# Patient Record
Sex: FEMALE | Race: BLACK OR AFRICAN AMERICAN | NOT HISPANIC OR LATINO | URBAN - METROPOLITAN AREA
[De-identification: names, ages, dates, MRNs, and addresses within clinical notes are randomized per-mention and may not be internally consistent; named-entity substitution may affect disease eponyms.]

---

## 2018-06-20 ENCOUNTER — OFFICE VISIT (OUTPATIENT)
Dept: URGENT CARE | Facility: CLINIC | Age: 43
End: 2018-06-20
Payer: MEDICAID

## 2018-06-20 VITALS
OXYGEN SATURATION: 99 % | TEMPERATURE: 98 F | DIASTOLIC BLOOD PRESSURE: 73 MMHG | WEIGHT: 146 LBS | SYSTOLIC BLOOD PRESSURE: 111 MMHG | HEART RATE: 65 BPM

## 2018-06-20 DIAGNOSIS — S91.332A PUNCTURE WOUND OF PLANTAR ASPECT OF LEFT FOOT, INITIAL ENCOUNTER: Primary | ICD-10-CM

## 2018-06-20 PROCEDURE — 99203 OFFICE O/P NEW LOW 30 MIN: CPT | Mod: S$GLB,,, | Performed by: FAMILY MEDICINE

## 2018-06-20 RX ORDER — NAPROXEN 500 MG/1
500 TABLET ORAL 2 TIMES DAILY WITH MEALS
Qty: 60 TABLET | Refills: 2 | Status: SHIPPED | OUTPATIENT
Start: 2018-06-20 | End: 2019-06-20

## 2018-06-20 RX ORDER — MUPIROCIN 20 MG/G
OINTMENT TOPICAL
Qty: 22 G | Refills: 1 | Status: SHIPPED | OUTPATIENT
Start: 2018-06-20 | End: 2020-10-11

## 2018-06-20 NOTE — PATIENT INSTRUCTIONS
Puncture Wound: Foot       A puncture wound occurs when a pointed object (such as a nail) pushes into the skin. It may go into the tissues below the skin of the foot, including fat and muscle. This type of wound is narrow and deep. They can be hard to clean. Puncture wounds are at high risk for becoming infected. One type of serious infection is more likely if you were wearing a rubber-soled shoe at the time of injury. Bacteria from the sole of the shoe may be dragged into the wound. Symptoms of infection may appear as late as 2 to 3 weeks after the injury. Be sure to watch for symptoms of infection and call your healthcare provider right away if any them appear.  X-rays may be done to see whether any objects remain under the skin. Your may also need a tetanus shot. This is given if you are not up to date on this vaccination and the object that caused the wound may lead to tetanus.  Puncture wounds can easily become infected.   Home care  · When you sit or lie down, raise the foot above the level of your heart. This helps reduce swelling and pain.  · Do not put weight on the injured foot if it hurts to do so or if you were told to keep weight off the injury.  · Your healthcare provider may prescribe an antibiotic. This is to help prevent infection. Follow all instructions for taking this medicine. Take the medicine every day until it is gone or you are told to stop. You should not have any left over.  · The healthcare provider may prescribe medicines for pain. Follow instructions for taking them.  · You can take acetaminophen or ibuprofen for pain, unless you were given a different pain medicine to use.   · Follow the healthcare providers instructions on how to care for the wound.  · Keep the wound clean and dry. Do not get the wound wet until you are told it is okay to do so. If the area gets wet, gently pat it dry with a clean cloth. Replace the wet bandage with a dry one.  · If a bandage was applied and it  becomes wet or dirty, replace it. Otherwise, leave it in place for the first 24 hours.  · Once you can get the wound wet, you may shower as usual but do not soak the wound in water (no tub baths or swimming)  · Check the wound daily for symptoms of infection. These include:  ¨ Increasing redness or swelling around the wound  ¨ Increased warmth of the wound  ¨ Worsening pain  ¨ Red streaking lines away from the wound  ¨ Draining pus  Follow-up care  Follow up with your healthcare provider as advised.   When to seek medical advice  Call your healthcare provider right away if any of these occur:  · Any symptoms of infection (listed above)  · Fever of 100.4°F (38.ºC) or higher, or as directed by your healthcare provider  · Wound changes colors  · Numbness around the wound  · Decreased movement around the injured area  Date Last Reviewed: 8/24/2015  © 4024-0253 [employer]. [address] [place] [address], [place] [address]. All rights reserved. This information is not intended as a substitute for professional medical care. Always follow your healthcare professional's instructions.

## 2018-06-20 NOTE — PROGRESS NOTES
Subjective:       Patient ID: Sandrine Lr is a 42 y.o. female.    Vitals:  weight is 66.2 kg (146 lb). Her tympanic temperature is 98.1 °F (36.7 °C). Her blood pressure is 111/73 and her pulse is 65. Her oxygen saturation is 99%.     Chief Complaint: Heel Pain    Pt states that she stepped on a old damion sign on her left heel about 4 days ago , tetanus is unknown      Foot Injury    The incident occurred more than 1 week ago. The incident occurred in the street. Injury mechanism: puncture. The pain is present in the left heel. The quality of the pain is described as stabbing. The pain is at a severity of 6/10. The pain is mild. The pain has been constant since onset. Associated symptoms include an inability to bear weight. She has tried nothing for the symptoms.     Review of Systems   All other systems reviewed and are negative.      Objective:      Physical Exam   Constitutional: She is oriented to person, place, and time. She appears well-developed.   HENT:   Head: Normocephalic.   Nose: Nose normal.   Mouth/Throat: Oropharynx is clear and moist.   Eyes: Conjunctivae and EOM are normal. Pupils are equal, round, and reactive to light.   Cardiovascular: Normal rate and regular rhythm.    Pulmonary/Chest: Breath sounds normal.   Abdominal: Bowel sounds are normal.   Neurological: She is alert and oriented to person, place, and time.   Skin: There is erythema.   Puncture woundon the plantar aspect of the left foot near heel. Mild ttp       Assessment:       1. Puncture wound of plantar aspect of left foot, initial encounter        Plan:         Puncture wound of plantar aspect of left foot, initial encounter  -     mupirocin (BACTROBAN) 2 % ointment; Apply to affected area 3 times daily  Dispense: 22 g; Refill: 1  -     diptheria-tetanus toxoids 2-2 Lf unit/0.5 mL injection 0.5 mL; Inject 0.5 mLs into the muscle once.  -     naproxen (NAPROSYN) 500 MG tablet; Take 1 tablet (500 mg total) by mouth 2 (two)  times daily with meals.  Dispense: 60 tablet; Refill: 2      Patient Instructions     Puncture Wound: Foot       A puncture wound occurs when a pointed object (such as a nail) pushes into the skin. It may go into the tissues below the skin of the foot, including fat and muscle. This type of wound is narrow and deep. They can be hard to clean. Puncture wounds are at high risk for becoming infected. One type of serious infection is more likely if you were wearing a rubber-soled shoe at the time of injury. Bacteria from the sole of the shoe may be dragged into the wound. Symptoms of infection may appear as late as 2 to 3 weeks after the injury. Be sure to watch for symptoms of infection and call your healthcare provider right away if any them appear.  X-rays may be done to see whether any objects remain under the skin. Your may also need a tetanus shot. This is given if you are not up to date on this vaccination and the object that caused the wound may lead to tetanus.  Puncture wounds can easily become infected.   Home care  · When you sit or lie down, raise the foot above the level of your heart. This helps reduce swelling and pain.  · Do not put weight on the injured foot if it hurts to do so or if you were told to keep weight off the injury.  · Your healthcare provider may prescribe an antibiotic. This is to help prevent infection. Follow all instructions for taking this medicine. Take the medicine every day until it is gone or you are told to stop. You should not have any left over.  · The healthcare provider may prescribe medicines for pain. Follow instructions for taking them.  · You can take acetaminophen or ibuprofen for pain, unless you were given a different pain medicine to use.   · Follow the healthcare providers instructions on how to care for the wound.  · Keep the wound clean and dry. Do not get the wound wet until you are told it is okay to do so. If the area gets wet, gently pat it dry with a clean  cloth. Replace the wet bandage with a dry one.  · If a bandage was applied and it becomes wet or dirty, replace it. Otherwise, leave it in place for the first 24 hours.  · Once you can get the wound wet, you may shower as usual but do not soak the wound in water (no tub baths or swimming)  · Check the wound daily for symptoms of infection. These include:  ¨ Increasing redness or swelling around the wound  ¨ Increased warmth of the wound  ¨ Worsening pain  ¨ Red streaking lines away from the wound  ¨ Draining pus  Follow-up care  Follow up with your healthcare provider as advised.   When to seek medical advice  Call your healthcare provider right away if any of these occur:  · Any symptoms of infection (listed above)  · Fever of 100.4°F (38.ºC) or higher, or as directed by your healthcare provider  · Wound changes colors  · Numbness around the wound  · Decreased movement around the injured area  Date Last Reviewed: 8/24/2015  © 0664-8789 [employer]. [address] [place] [address], [place] [address]. All rights reserved. This information is not intended as a substitute for professional medical care. Always follow your healthcare professional's instructions.

## 2018-12-20 ENCOUNTER — OFFICE VISIT (OUTPATIENT)
Dept: URGENT CARE | Facility: CLINIC | Age: 43
End: 2018-12-20
Payer: MEDICAID

## 2018-12-20 VITALS
DIASTOLIC BLOOD PRESSURE: 67 MMHG | HEIGHT: 68 IN | BODY MASS INDEX: 20.92 KG/M2 | HEART RATE: 71 BPM | SYSTOLIC BLOOD PRESSURE: 102 MMHG | TEMPERATURE: 98 F | OXYGEN SATURATION: 99 % | WEIGHT: 138 LBS

## 2018-12-20 DIAGNOSIS — R10.13 EPIGASTRIC PAIN: Primary | ICD-10-CM

## 2018-12-20 PROCEDURE — 99214 OFFICE O/P EST MOD 30 MIN: CPT | Mod: S$GLB,,, | Performed by: SURGERY

## 2018-12-20 RX ORDER — PANTOPRAZOLE SODIUM 40 MG/1
40 TABLET, DELAYED RELEASE ORAL DAILY
Qty: 30 TABLET | Refills: 0 | Status: SHIPPED | OUTPATIENT
Start: 2018-12-20

## 2018-12-20 RX ORDER — METOPROLOL SUCCINATE 50 MG/1
TABLET, EXTENDED RELEASE ORAL
Refills: 3 | COMMUNITY
Start: 2018-12-15

## 2018-12-20 NOTE — PATIENT INSTRUCTIONS
Metamucil fiber and colace may be used for stool softener in case of constipation.    Tylenol is preferred for headache until stomach pain is improved.        Epigastric Pain (Uncertain Cause)     Epigastric pain can be a sign of disease in the upper abdomen. Common causes include:  · Acid reflux (stomach acid flowing up into the esophagus)  · Gastritis (irritation of the stomach lining)  · Peptic Ulcer Disease  · Inflammation of the pancreas  · Gallstone  · Infection in the gallbladder  Pain may be dull or burning. It may spread upward to the chest or to the back. There may be other symptoms such as belching, bloating, cramps or hunger pains. There may be weight loss or poor appetite, nausea or vomiting.  Since the diagnosis of your pain is not certain yet, further tests may sometimes be needed. Sometimes the doctor will treat you for the most likely condition to see if there is improvement before doing further tests.  Home care  Medicines  · Antacids help neutralize the normal acids in your stomach. Examples are Maalox, Mylanta, Rolaids, and Tums. If you dont like the liquid, you can also try a chewable one. You may find one works better than another for you. Overuse can cause diarrhea or constipation.  · Acid blockers (H2 blockers) decrease acid production. Examples are cimetidine (Tagamet), famotidine (Pepcid) and ranitidine (Zantac).  · Acid inhibitors (PPIs) decrease acid production in a different way than the blockers. You may find they work better, but can take a little longer to take effect.  Examples are omeprazole (Prilosec), lansoprazole (Prevacid), pantoprazole (Protonix), rabeprazole (Aciphex), and esomeprazole (Nexium).  · Take an antacid 30-60 minutes after eating and at bedtime, but not at the same time as an acid blocker.  · Try not to take NSAIDs. Aspirin may also cause problems, but if taking it for your heart or other medical reasons, talk to your doctor before stopping it; you do not want to  cause a worse problem, like a heart attack or stroke.  Diet  · If certain foods seem to cause your spasm, try to avoid them.   · Eat slowly and chew food well before swallowing. Symptoms of gastritis can be worsened by certain foods. Limit or avoid fatty, fried, and spicy foods, as well as coffee, chocolate, mint, and foods with high acid content such as tomatoes and citrus fruit and juices (orange, grapefruit, lemon).  · Avoid alcohol, caffeine, and tobacco, which can delay healing and worsen your problem.  · Try eating smaller meals with snacks in between  Follow-up care  Follow up with your healthcare provider or as advised.  When to seek medical advice  Call your healthcare provider right away if any of the following occur:  · Stomach pain worsens or moves to the right lower part of the abdomen  · Chest pain appears, or if it worsens or spreads to the chest, back, neck, shoulder, or arm  · Frequent vomiting (cant keep down liquids)  · Blood in the stool or vomit (red or black color)  · Feeling weak or dizzy, fainting, or having trouble breathing  · Fever of 100.4ºF (38ºC) or higher, or as directed by your healthcare provider  · Abdominal swelling  Date Last Reviewed: 9/25/2015  © 2338-5779 [employer]. [address] [place] [address], [place] [address]. All rights reserved. This information is not intended as a substitute for professional medical care. Always follow your healthcare professional's instructions.

## 2018-12-20 NOTE — PROGRESS NOTES
"Subjective:       Patient ID: Sandrine Lr is a 43 y.o. female.    Vitals:  height is 5' 8" (1.727 m) and weight is 62.6 kg (138 lb). Her temperature is 97.8 °F (36.6 °C). Her blood pressure is 102/67 and her pulse is 71. Her oxygen saturation is 99%.     Chief Complaint: Abdominal Pain    Abdominal Pain   This is a new problem. The current episode started more than 1 month ago. The onset quality is sudden. The problem occurs constantly. The problem has been unchanged. The pain is located in the generalized abdominal region. The pain is at a severity of 9/10. The pain is severe. The quality of the pain is aching, burning and cramping. Associated symptoms include nausea. Pertinent negatives include no constipation, diarrhea, dysuria, fever or vomiting. The pain is aggravated by eating. Treatments tried: zantac  The treatment provided mild relief. There is no history of abdominal surgery.       Constitution: Negative for appetite change, chills, sweating and fever.   HENT: Negative for trouble swallowing.    Cardiovascular: Negative for chest pain.   Respiratory: Negative for shortness of breath.    Gastrointestinal: Positive for abdominal pain, nausea and dark colored stools. Negative for abdominal trauma, abdominal bloating, history of abdominal surgery, vomiting, constipation, diarrhea and heartburn.   Genitourinary: Negative for dysuria, missed menses and pelvic pain.   Musculoskeletal: Negative for back pain.       Objective:      Physical Exam   Constitutional: She is oriented to person, place, and time. She appears well-developed and well-nourished. She is cooperative.  Non-toxic appearance. She does not appear ill. No distress.   HENT:   Head: Normocephalic and atraumatic.   Right Ear: Hearing, external ear and ear canal normal. Tympanic membrane is injected and erythematous.   Left Ear: Hearing, external ear and ear canal normal. Tympanic membrane is erythematous and retracted.   Nose: Mucosal edema " and rhinorrhea present. No nasal deformity. No epistaxis. Right sinus exhibits no maxillary sinus tenderness and no frontal sinus tenderness. Left sinus exhibits no maxillary sinus tenderness and no frontal sinus tenderness.   Mouth/Throat: Uvula is midline, oropharynx is clear and moist and mucous membranes are normal. No trismus in the jaw. Normal dentition. No uvula swelling. No posterior oropharyngeal erythema.   Eyes: Conjunctivae and lids are normal. No scleral icterus.   Sclera clear bilat   Neck: Trachea normal, full passive range of motion without pain and phonation normal. Neck supple.   Cardiovascular: Normal rate, regular rhythm, normal heart sounds, intact distal pulses and normal pulses.   Pulmonary/Chest: Effort normal and breath sounds normal. No respiratory distress.   Abdominal: Soft. Normal appearance and bowel sounds are normal. She exhibits no distension. There is no tenderness.   Musculoskeletal: Normal range of motion. She exhibits no edema or deformity.   Neurological: She is alert and oriented to person, place, and time. She exhibits normal muscle tone. Coordination normal.   Skin: Skin is warm, dry and intact. She is not diaphoretic. No pallor.   Psychiatric: She has a normal mood and affect. Her speech is normal and behavior is normal. Judgment and thought content normal. Cognition and memory are normal.   Nursing note and vitals reviewed.      Assessment:       1. Epigastric pain        Plan:         Epigastric pain  -     pantoprazole (PROTONIX) 40 MG tablet; Take 1 tablet (40 mg total) by mouth once daily.  Dispense: 30 tablet; Refill: 0      Patient Instructions   Metamucil fiber and colace may be used for stool softener in case of constipation.    Tylenol is preferred for headache until stomach pain is improved.        Epigastric Pain (Uncertain Cause)     Epigastric pain can be a sign of disease in the upper abdomen. Common causes include:  · Acid reflux (stomach acid flowing up into  the esophagus)  · Gastritis (irritation of the stomach lining)  · Peptic Ulcer Disease  · Inflammation of the pancreas  · Gallstone  · Infection in the gallbladder  Pain may be dull or burning. It may spread upward to the chest or to the back. There may be other symptoms such as belching, bloating, cramps or hunger pains. There may be weight loss or poor appetite, nausea or vomiting.  Since the diagnosis of your pain is not certain yet, further tests may sometimes be needed. Sometimes the doctor will treat you for the most likely condition to see if there is improvement before doing further tests.  Home care  Medicines  · Antacids help neutralize the normal acids in your stomach. Examples are Maalox, Mylanta, Rolaids, and Tums. If you dont like the liquid, you can also try a chewable one. You may find one works better than another for you. Overuse can cause diarrhea or constipation.  · Acid blockers (H2 blockers) decrease acid production. Examples are cimetidine (Tagamet), famotidine (Pepcid) and ranitidine (Zantac).  · Acid inhibitors (PPIs) decrease acid production in a different way than the blockers. You may find they work better, but can take a little longer to take effect.  Examples are omeprazole (Prilosec), lansoprazole (Prevacid), pantoprazole (Protonix), rabeprazole (Aciphex), and esomeprazole (Nexium).  · Take an antacid 30-60 minutes after eating and at bedtime, but not at the same time as an acid blocker.  · Try not to take NSAIDs. Aspirin may also cause problems, but if taking it for your heart or other medical reasons, talk to your doctor before stopping it; you do not want to cause a worse problem, like a heart attack or stroke.  Diet  · If certain foods seem to cause your spasm, try to avoid them.   · Eat slowly and chew food well before swallowing. Symptoms of gastritis can be worsened by certain foods. Limit or avoid fatty, fried, and spicy foods, as well as coffee, chocolate, mint, and foods  with high acid content such as tomatoes and citrus fruit and juices (orange, grapefruit, lemon).  · Avoid alcohol, caffeine, and tobacco, which can delay healing and worsen your problem.  · Try eating smaller meals with snacks in between  Follow-up care  Follow up with your healthcare provider or as advised.  When to seek medical advice  Call your healthcare provider right away if any of the following occur:  · Stomach pain worsens or moves to the right lower part of the abdomen  · Chest pain appears, or if it worsens or spreads to the chest, back, neck, shoulder, or arm  · Frequent vomiting (cant keep down liquids)  · Blood in the stool or vomit (red or black color)  · Feeling weak or dizzy, fainting, or having trouble breathing  · Fever of 100.4ºF (38ºC) or higher, or as directed by your healthcare provider  · Abdominal swelling  Date Last Reviewed: 9/25/2015  © 6400-3325 [employer]. [address] [place] [address], [place] [address]. All rights reserved. This information is not intended as a substitute for professional medical care. Always follow your healthcare professional's instructions.

## 2019-03-06 ENCOUNTER — OFFICE VISIT (OUTPATIENT)
Dept: URGENT CARE | Facility: CLINIC | Age: 44
End: 2019-03-06
Payer: MEDICAID

## 2019-03-06 VITALS
SYSTOLIC BLOOD PRESSURE: 101 MMHG | WEIGHT: 138 LBS | BODY MASS INDEX: 20.92 KG/M2 | HEART RATE: 82 BPM | TEMPERATURE: 99 F | DIASTOLIC BLOOD PRESSURE: 69 MMHG | HEIGHT: 68 IN | OXYGEN SATURATION: 98 % | RESPIRATION RATE: 20 BRPM

## 2019-03-06 DIAGNOSIS — H02.826 CYST OF LEFT EYELID: Primary | ICD-10-CM

## 2019-03-06 PROCEDURE — 99214 PR OFFICE/OUTPT VISIT, EST, LEVL IV, 30-39 MIN: ICD-10-PCS | Mod: S$GLB,,, | Performed by: SURGERY

## 2019-03-06 PROCEDURE — 99214 OFFICE O/P EST MOD 30 MIN: CPT | Mod: S$GLB,,, | Performed by: SURGERY

## 2019-03-06 RX ORDER — DOXYCYCLINE 100 MG/1
100 CAPSULE ORAL 2 TIMES DAILY
Qty: 14 CAPSULE | Refills: 0 | Status: SHIPPED | OUTPATIENT
Start: 2019-03-06 | End: 2019-03-13

## 2019-03-06 RX ORDER — LORAZEPAM 1 MG/1
TABLET ORAL
Refills: 1 | COMMUNITY
Start: 2019-02-06

## 2019-03-06 RX ORDER — AMITRIPTYLINE HYDROCHLORIDE 25 MG/1
TABLET, FILM COATED ORAL
Refills: 3 | COMMUNITY
Start: 2019-02-17

## 2019-03-06 NOTE — PROGRESS NOTES
"Subjective:       Patient ID: Sandrine Lr is a 43 y.o. female.    Vitals:  height is 5' 8" (1.727 m) and weight is 62.6 kg (138 lb). Her temperature is 98.6 °F (37 °C). Her blood pressure is 101/69 and her pulse is 82. Her respiration is 20 and oxygen saturation is 98%.     Chief Complaint: Facial Swelling    Pt staes for three days she has been having swelling around her left eye. She also has a bump above it that she tried to bust .      Eye Problem    The left eye is affected. This is a new problem. Episode onset: 2 days ago. The problem occurs constantly. The problem has been unchanged. There was no injury mechanism. There is no known exposure to pink eye. She does not wear contacts. She has tried nothing for the symptoms.       Eyes: Positive for eye pain.       Objective:      Physical Exam   Constitutional: She is oriented to person, place, and time. She appears well-developed and well-nourished.   HENT:   Head: Normocephalic and atraumatic.   Right Ear: External ear normal.   Left Ear: External ear normal.   Nose: Nose normal.   Mouth/Throat: Oropharynx is clear and moist.   Eyes: Conjunctivae, EOM and lids are normal. Pupils are equal, round, and reactive to light.       Neck: Trachea normal, full passive range of motion without pain and phonation normal. Neck supple.   Musculoskeletal: Normal range of motion.   Neurological: She is alert and oriented to person, place, and time.   Skin: Skin is warm, dry and intact.   Psychiatric: She has a normal mood and affect. Her speech is normal and behavior is normal. Judgment and thought content normal. Cognition and memory are normal.   Nursing note and vitals reviewed.      Assessment:       1. Cyst of left eyelid        Plan:         Cyst of left eyelid  -     doxycycline (MONODOX) 100 MG capsule; Take 1 capsule (100 mg total) by mouth 2 (two) times daily. for 7 days  Dispense: 14 capsule; Refill: 0         "

## 2020-10-11 ENCOUNTER — OFFICE VISIT (OUTPATIENT)
Dept: URGENT CARE | Facility: CLINIC | Age: 45
End: 2020-10-11
Payer: MEDICAID

## 2020-10-11 VITALS
HEIGHT: 68 IN | RESPIRATION RATE: 18 BRPM | BODY MASS INDEX: 20.98 KG/M2 | OXYGEN SATURATION: 97 % | TEMPERATURE: 99 F | SYSTOLIC BLOOD PRESSURE: 120 MMHG | HEART RATE: 74 BPM | DIASTOLIC BLOOD PRESSURE: 78 MMHG

## 2020-10-11 DIAGNOSIS — S01.81XA LACERATION OF FOREHEAD WITHOUT COMPLICATION, INITIAL ENCOUNTER: Primary | ICD-10-CM

## 2020-10-11 PROBLEM — F41.9 ANXIETY: Status: ACTIVE | Noted: 2019-09-22

## 2020-10-11 PROBLEM — K21.9 GASTROESOPHAGEAL REFLUX DISEASE WITHOUT ESOPHAGITIS: Status: ACTIVE | Noted: 2020-10-11

## 2020-10-11 PROBLEM — G62.9 NEUROPATHY: Status: ACTIVE | Noted: 2020-10-11

## 2020-10-11 PROBLEM — I10 ESSENTIAL HYPERTENSION: Status: ACTIVE | Noted: 2020-10-11

## 2020-10-11 PROCEDURE — 12011 RPR F/E/E/N/L/M 2.5 CM/<: CPT | Mod: S$GLB,,, | Performed by: STUDENT IN AN ORGANIZED HEALTH CARE EDUCATION/TRAINING PROGRAM

## 2020-10-11 PROCEDURE — 12011 LACERATION REPAIR: ICD-10-PCS | Mod: S$GLB,,, | Performed by: STUDENT IN AN ORGANIZED HEALTH CARE EDUCATION/TRAINING PROGRAM

## 2020-10-11 PROCEDURE — 99213 PR OFFICE/OUTPT VISIT, EST, LEVL III, 20-29 MIN: ICD-10-PCS | Mod: 25,S$GLB,, | Performed by: STUDENT IN AN ORGANIZED HEALTH CARE EDUCATION/TRAINING PROGRAM

## 2020-10-11 PROCEDURE — 99213 OFFICE O/P EST LOW 20 MIN: CPT | Mod: 25,S$GLB,, | Performed by: STUDENT IN AN ORGANIZED HEALTH CARE EDUCATION/TRAINING PROGRAM

## 2020-10-11 RX ORDER — MUPIROCIN 20 MG/G
OINTMENT TOPICAL 3 TIMES DAILY
Qty: 22 G | Refills: 0 | Status: SHIPPED | OUTPATIENT
Start: 2020-10-11 | End: 2020-10-18

## 2020-10-11 RX ORDER — IBUPROFEN 600 MG/1
600 TABLET ORAL EVERY 8 HOURS PRN
Qty: 15 TABLET | Refills: 0 | Status: SHIPPED | OUTPATIENT
Start: 2020-10-11 | End: 2020-10-16

## 2020-10-11 NOTE — PROGRESS NOTES
"Subjective:       Patient ID: Sandrine Lr is a 44 y.o. female.    Vitals:  height is 5' 8" (1.727 m). Her temperature is 98.5 °F (36.9 °C). Her pulse is 74. Her respiration is 18 and oxygen saturation is 97%.     Chief Complaint: Laceration    Pt stated that about 45 minutes ago while a surge trampoline park she hit the right side of her head on the metal piece if equipment at [employer]. Now has pain and mild bleeding in area. Denied HA, LoC, vision changes, focal deficits. Tetanus UTD.    Laceration   The incident occurred less than 1 hour ago. The laceration is located on the scalp. The laceration is 2 cm in size. The laceration mechanism was a blunt object. The pain is at a severity of 10/10. The pain is severe. The pain has been constant since onset. She reports no foreign bodies present. Her tetanus status is UTD.       Constitution: Negative for chills, fatigue and fever.   HENT: Positive for facial swelling and facial trauma. Negative for ear pain, congestion, sore throat, trouble swallowing and voice change.    Neck: Negative for neck pain and painful lymph nodes.   Cardiovascular: Negative for chest pain, leg swelling and sob on exertion.   Eyes: Negative for eye trauma, double vision and blurred vision.   Respiratory: Negative for cough and shortness of breath.    Gastrointestinal: Negative for abdominal trauma, nausea, vomiting and diarrhea.   Musculoskeletal: Positive for pain and trauma. Negative for joint pain, joint swelling, back pain, muscle cramps and muscle ache.   Skin: Positive for laceration. Negative for color change, rash and bruising.   Allergic/Immunologic: Negative for seasonal allergies.   Neurological: Negative for dizziness, light-headedness, passing out, facial drooping, speech difficulty, headaches, altered mental status, numbness and tingling.   Hematologic/Lymphatic: Negative for swollen lymph nodes.   Psychiatric/Behavioral: Negative for altered mental status, " nervous/anxious, sleep disturbance and depression. The patient is not nervous/anxious.        Objective:      Physical Exam   Constitutional: She is oriented to person, place, and time. She appears well-developed. No distress.   HENT:   Head: Normocephalic. Head is with laceration. Head is without raccoon's eyes, without Howe's sign and without abrasion.       Ears:   Right Ear: Hearing, tympanic membrane, external ear and ear canal normal. No mastoid tenderness. No hemotympanum.   Left Ear: Hearing, tympanic membrane, external ear and ear canal normal. No mastoid tenderness. No hemotympanum.   Nose: Nose normal. No nasal deformity.   Eyes: Pupils are equal, round, and reactive to light. Conjunctivae, EOM and lids are normal. Right eye exhibits no discharge. Left eye exhibits no discharge. No scleral icterus. extraocular movement intact  Neck: Normal range of motion. Neck supple.   Pulmonary/Chest: Effort normal.   Abdominal: Normal appearance.   Musculoskeletal: Normal range of motion.         General: No deformity.   Neurological: She is alert and oriented to person, place, and time. She displays no weakness. No cranial nerve deficit (CN II-XII intact) or sensory deficit. She exhibits normal muscle tone. Coordination normal.   Skin: Skin is warm and dry. bruisingPsychiatric: Her speech is normal and behavior is normal. Judgment and thought content normal.   Nursing note and vitals reviewed.        Assessment:       1. Laceration of forehead without complication, initial encounter        Plan:         Laceration of forehead without complication, initial encounter  -     mupirocin (BACTROBAN) 2 % ointment; Apply topically 3 (three) times daily. for 7 days  Dispense: 22 g; Refill: 0  -     ibuprofen (ADVIL,MOTRIN) 600 MG tablet; Take 1 tablet (600 mg total) by mouth every 8 (eight) hours as needed for Pain.  Dispense: 15 tablet; Refill: 0  -     Laceration Repair  - counseled on home care and OTC  medications    Follow up in about 1 week (around 10/18/2020) for suture removal, or sooner if signs of infection.    Melvin Moncada MD/MPH  [facility]  [facility]

## 2020-10-11 NOTE — PROCEDURES
"Laceration Repair    Date/Time: 10/11/2020 4:35 PM  Performed by: Melvin Moncada MD  Authorized by: Melvin Moncada MD   Consent Done: Yes  Consent: Verbal consent obtained.  Risks and benefits: risks, benefits and alternatives were discussed  Consent given by: patient  Patient identity confirmed: , name and verbally with patient  Time out: Immediately prior to procedure a "time out" was called to verify the correct patient, procedure, equipment, support staff and site/side marked as required.  Body area: head/neck  Location details: forehead  Laceration length: 1.5 cm  Contaminated: None.  Foreign bodies: no foreign bodies  Tendon involvement: none  Nerve involvement: none  Vascular damage: no  Anesthesia: local infiltration    Anesthesia:  Local Anesthetic: LET (lido,epi,tetracaine)  Anesthetic total: 1 mL  Patient sedated: no  Preparation: Patient was prepped and draped in the usual sterile fashion.  Irrigation solution: tap water (Peroxide)  Irrigation method: syringe  Amount of cleaning: standard  Debridement: none  Degree of undermining: none  Skin closure: 4-0 nylon  Number of sutures: 2  Technique: simple  Approximation: loose  Approximation difficulty: simple  Dressing: antibiotic ointment  Patient tolerance: Patient tolerated the procedure well with no immediate complications        "

## 2020-10-11 NOTE — PATIENT INSTRUCTIONS
Suture Care    Stitches (sutures) are used to close wounds. Sutures also help stop bleeding and speed healing. To help your wound heal, follow the tips on this handout.  Some sutures need to be removed by a healthcare provider. Others dissolve on their own. Sometimes strips of tape are used. Youll be told what kind of sutures you have.   Keep sutures clean  · Avoid doing things that could cause dirt or sweat to get on your sutures. If needed, cover your sutures with a bandage (dressing) to protect them.  · Dont pick at scabs. They help protect the wound.  · Dont wash the area around your sutures unless your healthcare provider says its OK. Then, follow his or her instructions for washing and drying.  Keep sutures dry  · Keep your sutures out of water.  · Take a sponge bath to avoid getting your sutures wound wet, unless your healthcare provider tells you otherwise.  · Ask your provider when can you take a shower or bathe.  · Ask your provider about the best way to keep your sutures dry when bathing or showering.  · If sutures get damp, pat them dry.  Changing your dressing  Leave the dressing in place until you are told to remove it or change it. Change it only as directed, using clean hands:  · After the first ___hours, change your dressing every ___hours.  · Change your dressing if it gets wet or dirty.  Other tips  · To help wounds on an arm or leg heal, use the affected limb as little as possible.  · To help reduce swelling and throbbing, raise the area with sutures above your heart.  · To help prevent itching, cover sutures with gauze. If sutures itch, try not to scratch them.  · For pain relief, try acetaminophen or ibuprofen. Dont use aspirin. It can increase bleeding.  When to seek medical care  Call your healthcare provider if you notice any of the following signs:  · Increased soreness, pain, or tenderness after 24 hours  · A red streak, increased redness, or puffiness near the wound  · White,  yellowish, or bad smelling discharge from the wound  · Bleeding that cant be stopped by applying pressure  · Steri-Strips fall off or stitches dissolve before the wound heals  · Fever over 100.4°F (38.0°C)   Date Last Reviewed: 7/1/2016  © 4696-9775 [employer]. [address] [place] [address], [place] [address]. All rights reserved. This information is not intended as a substitute for professional medical care. Always follow your healthcare professional's instructions.

## 2021-09-28 ENCOUNTER — OFFICE VISIT (OUTPATIENT)
Dept: URGENT CARE | Facility: CLINIC | Age: 46
End: 2021-09-28
Payer: MEDICAID

## 2021-09-28 VITALS
OXYGEN SATURATION: 99 % | TEMPERATURE: 99 F | SYSTOLIC BLOOD PRESSURE: 144 MMHG | HEART RATE: 74 BPM | WEIGHT: 138 LBS | DIASTOLIC BLOOD PRESSURE: 93 MMHG | RESPIRATION RATE: 18 BRPM | BODY MASS INDEX: 20.92 KG/M2 | HEIGHT: 68 IN

## 2021-09-28 DIAGNOSIS — R22.0 LEFT FACIAL SWELLING: ICD-10-CM

## 2021-09-28 DIAGNOSIS — K08.89 PAIN, DENTAL: ICD-10-CM

## 2021-09-28 DIAGNOSIS — K04.7 DENTAL ABSCESS: Primary | ICD-10-CM

## 2021-09-28 PROCEDURE — 99214 PR OFFICE/OUTPT VISIT, EST, LEVL IV, 30-39 MIN: ICD-10-PCS | Mod: S$GLB,,, | Performed by: NURSE PRACTITIONER

## 2021-09-28 PROCEDURE — 99214 OFFICE O/P EST MOD 30 MIN: CPT | Mod: S$GLB,,, | Performed by: NURSE PRACTITIONER

## 2021-09-28 RX ORDER — AMOXICILLIN AND CLAVULANATE POTASSIUM 875; 125 MG/1; MG/1
1 TABLET, FILM COATED ORAL EVERY 12 HOURS
Qty: 14 TABLET | Refills: 0 | Status: SHIPPED | OUTPATIENT
Start: 2021-09-28 | End: 2021-10-05

## 2021-09-28 RX ORDER — IBUPROFEN 600 MG/1
600 TABLET ORAL 3 TIMES DAILY PRN
Qty: 30 TABLET | Refills: 0 | Status: SHIPPED | OUTPATIENT
Start: 2021-09-28

## 2021-09-28 RX ORDER — KETOROLAC TROMETHAMINE 30 MG/ML
30 INJECTION, SOLUTION INTRAMUSCULAR; INTRAVENOUS
Status: COMPLETED | OUTPATIENT
Start: 2021-09-28 | End: 2021-09-28

## 2021-09-28 RX ADMIN — KETOROLAC TROMETHAMINE 30 MG: 30 INJECTION, SOLUTION INTRAMUSCULAR; INTRAVENOUS at 07:09

## 2022-02-08 ENCOUNTER — HOSPITAL ENCOUNTER (EMERGENCY)
Facility: HOSPITAL | Age: 47
Discharge: HOME | End: 2022-02-08
Attending: EMERGENCY MEDICINE
Payer: MEDICAID

## 2022-02-08 VITALS
HEART RATE: 73 BPM | BODY MASS INDEX: 22.22 KG/M2 | RESPIRATION RATE: 17 BRPM | DIASTOLIC BLOOD PRESSURE: 80 MMHG | SYSTOLIC BLOOD PRESSURE: 134 MMHG | HEIGHT: 69 IN | TEMPERATURE: 98 F | WEIGHT: 150 LBS | OXYGEN SATURATION: 98 %

## 2022-02-08 DIAGNOSIS — F41.9 ANXIETY: Primary | ICD-10-CM

## 2022-02-08 PROCEDURE — 99284 EMERGENCY DEPT VISIT MOD MDM: CPT

## 2022-02-08 PROCEDURE — 25000003 PHARM REV CODE 250: Performed by: EMERGENCY MEDICINE

## 2022-02-08 PROCEDURE — 93010 EKG 12-LEAD: ICD-10-PCS | Mod: ,,, | Performed by: INTERNAL MEDICINE

## 2022-02-08 PROCEDURE — 93005 ELECTROCARDIOGRAM TRACING: CPT

## 2022-02-08 PROCEDURE — 93010 ELECTROCARDIOGRAM REPORT: CPT | Mod: ,,, | Performed by: INTERNAL MEDICINE

## 2022-02-08 RX ORDER — DIAZEPAM 5 MG/1
5 TABLET ORAL
Status: COMPLETED | OUTPATIENT
Start: 2022-02-08 | End: 2022-02-08

## 2022-02-08 RX ORDER — HYDROXYZINE HYDROCHLORIDE 25 MG/1
25 TABLET, FILM COATED ORAL EVERY 4 HOURS PRN
Qty: 12 TABLET | Refills: 0 | Status: SHIPPED | OUTPATIENT
Start: 2022-02-08

## 2022-02-08 RX ORDER — DIAZEPAM 5 MG/1
5 TABLET ORAL EVERY 6 HOURS PRN
Qty: 5 TABLET | Refills: 0 | Status: SHIPPED | OUTPATIENT
Start: 2022-02-08 | End: 2022-03-10

## 2022-02-08 RX ADMIN — DIAZEPAM 5 MG: 5 TABLET ORAL at 06:02

## 2022-02-08 NOTE — ED PROVIDER NOTES
"Encounter Date: 2/8/2022       History     Chief Complaint   Patient presents with    Anxiety     Pt presents to ED c/o anxiety.  "I can't sleep at all."  Denies SI or HI.  Hx of anxiety.  Pt taking lorazepam at 0900 yesterday, escitalopram 5mg at 1100 yesterday and night time sleep aid 2200 last night, but was unable to sleep.  Pt tearful in triage and hugging daughter.  Pain 0/10.     HPI     46-year-old female with past medical history of anxiety presents with difficulty sleeping, anxiety.  Patient reports continue to see a new psychiatrist, Dr. Watt, at [facility], was recently started on escitalopram 5 mg, which she has been taking for the last week.  She reports over last week having worsening thoughts, no thoughts of harming herself or anybody else, but has been having difficulty sleeping because of them.  She reports this happened intermittently over the last couple years and even months.  She reports that sometime in the past being on Wellbutrin, lorazepam, amitriptyline, she reports continuing to take her lorazepam daily.  She reports taking a half of a lorazepam yesterday which did not seem to help.  She reports normally taking about 1.5 tablets a day.  She reports having a little bit of a tension headache earlier today, has tried other medications over-the-counter, sleep aids without any improvement in her symptoms.  She reports having follow-up with her psychiatrist next Monday.  Denies any chest pain, visual complaint, numbness/tingling, headache, abdominal pain, nausea/vomiting/diarrhea, shortness of breath, or any further complaint.    Review of patient's allergies indicates:  No Known Allergies  No past medical history on file.  No past surgical history on file.  No family history on file.  Social History     Tobacco Use    Smoking status: Never Smoker     Review of Systems   Constitutional: Negative.    HENT: Negative.    Eyes: Negative.    Respiratory: Negative.    Cardiovascular: Negative.  "   Gastrointestinal: Negative.    Genitourinary: Negative.    Musculoskeletal: Negative.    Skin: Negative.    Neurological: Negative.    Psychiatric/Behavioral: The patient is nervous/anxious.        Physical Exam     Initial Vitals [02/08/22 0535]   BP Pulse Resp Temp SpO2   132/79 90 19 98.1 °F (36.7 °C) 100 %      MAP       --         Physical Exam    Nursing note and vitals reviewed.  Constitutional: She appears well-developed and well-nourished. She is not diaphoretic. No distress.   HENT:   Head: Normocephalic and atraumatic.   Nose: Nose normal.   Eyes: EOM are normal. Pupils are equal, round, and reactive to light.   Neck: Neck supple. No JVD present.   Normal range of motion.  Cardiovascular: Normal rate, regular rhythm, normal heart sounds and intact distal pulses.   Pulmonary/Chest: Breath sounds normal. No stridor. No respiratory distress. She has no wheezes. She has no rales.   Abdominal: Abdomen is soft. Bowel sounds are normal. She exhibits no distension. There is no abdominal tenderness.   Musculoskeletal:         General: No tenderness or edema. Normal range of motion.      Cervical back: Normal range of motion and neck supple.     Neurological: She is alert and oriented to person, place, and time. She has normal strength.   Skin: Skin is warm and dry. Capillary refill takes less than 2 seconds. No rash noted. No erythema.         ED Course   Procedures  Labs Reviewed - No data to display       Imaging Results    None          Medications   diazePAM tablet 5 mg (5 mg Oral Given 2/8/22 0629)                 ED Course as of 02/08/22 0702 Tue Feb 08, 2022 0702 EKG-normal sinus rhythm rate of 71 beats per minute, nonspecific ST T-wave changes noted,  ms, no STEMI. [BB]      ED Course User Index  [BB] Rafael Friedman MD          MDM:    46-year-old female with past medical history as noted above presenting with concern for anxiety.  After extensive discussion patient not requiring any  psychiatric consult currently, no indication for pec/CEC do not suspect benzodiazepine withdrawal, intoxication, polysubstance abuse.  Patient is not gravely disabled, suicidal or homicidal.  Will give dose of Valium here, additional prescription for Atarax as alternative medication at home, will stop escitalopram until she can see her psychiatrist either on Monday or sooner.  Communicated this to the daughter and patient at bedside, do not suspect any further malignant etiology.  Return precautions discussed, patient stable for discharge at this time.       Clinical Impression:   Final diagnoses:  [F41.9] Anxiety (Primary)          ED Disposition Condition    Discharge Stable        ED Prescriptions     Medication Sig Dispense Start Date End Date Auth. Provider    diazePAM (VALIUM) 5 MG tablet Take 1 tablet (5 mg total) by mouth every 6 (six) hours as needed for Anxiety. 5 tablet 2/8/2022 3/10/2022 Rafael Friedman MD    hydrOXYzine HCL (ATARAX) 25 MG tablet Take 1 tablet (25 mg total) by mouth every 4 (four) hours as needed for Anxiety. 12 tablet 2/8/2022  Rafael Friedman MD        Follow-up Information     Follow up With Specialties Details Why Contact Info    [facility] [place] [facility] Emergency Medicine Go to  If symptoms worsen [id number]  [facility] [id number] [phone]    [facility], Psychiatry, Psychology Schedule an appointment as soon as possible for a visit   [address] [place] [id number] [phone]             Rafael Friedman MD  02/08/22 0702

## 2022-09-25 ENCOUNTER — OFFICE VISIT (OUTPATIENT)
Dept: URGENT CARE | Facility: CLINIC | Age: 47
End: 2022-09-25
Payer: MEDICAID

## 2022-09-25 VITALS
OXYGEN SATURATION: 98 % | WEIGHT: 150 LBS | RESPIRATION RATE: 18 BRPM | HEART RATE: 60 BPM | TEMPERATURE: 99 F | HEIGHT: 69 IN | DIASTOLIC BLOOD PRESSURE: 75 MMHG | BODY MASS INDEX: 22.22 KG/M2 | SYSTOLIC BLOOD PRESSURE: 122 MMHG

## 2022-09-25 DIAGNOSIS — N30.01 ACUTE CYSTITIS WITH HEMATURIA: Primary | ICD-10-CM

## 2022-09-25 LAB
BILIRUB UR QL STRIP: NEGATIVE
GLUCOSE UR QL STRIP: NEGATIVE
KETONES UR QL STRIP: NEGATIVE
LEUKOCYTE ESTERASE UR QL STRIP: POSITIVE
PH, POC UA: 6
POC BLOOD, URINE: POSITIVE
POC NITRATES, URINE: NEGATIVE
PROT UR QL STRIP: POSITIVE
SP GR UR STRIP: 1.01 (ref 1–1.03)
UROBILINOGEN UR STRIP-ACNC: ABNORMAL (ref 0.1–1.1)

## 2022-09-25 PROCEDURE — 3078F DIAST BP <80 MM HG: CPT | Mod: CPTII,S$GLB,,

## 2022-09-25 PROCEDURE — 99213 OFFICE O/P EST LOW 20 MIN: CPT | Mod: S$GLB,,,

## 2022-09-25 PROCEDURE — 3074F PR MOST RECENT SYSTOLIC BLOOD PRESSURE < 130 MM HG: ICD-10-PCS | Mod: CPTII,S$GLB,,

## 2022-09-25 PROCEDURE — 1159F PR MEDICATION LIST DOCUMENTED IN MEDICAL RECORD: ICD-10-PCS | Mod: CPTII,S$GLB,,

## 2022-09-25 PROCEDURE — 3078F PR MOST RECENT DIASTOLIC BLOOD PRESSURE < 80 MM HG: ICD-10-PCS | Mod: CPTII,S$GLB,,

## 2022-09-25 PROCEDURE — 99213 PR OFFICE/OUTPT VISIT, EST, LEVL III, 20-29 MIN: ICD-10-PCS | Mod: S$GLB,,,

## 2022-09-25 PROCEDURE — 3008F BODY MASS INDEX DOCD: CPT | Mod: CPTII,S$GLB,,

## 2022-09-25 PROCEDURE — 81003 URINALYSIS AUTO W/O SCOPE: CPT | Mod: QW,S$GLB,,

## 2022-09-25 PROCEDURE — 1160F RVW MEDS BY RX/DR IN RCRD: CPT | Mod: CPTII,S$GLB,,

## 2022-09-25 PROCEDURE — 81003 POCT URINALYSIS, DIPSTICK, AUTOMATED, W/O SCOPE: ICD-10-PCS | Mod: QW,S$GLB,,

## 2022-09-25 PROCEDURE — 3074F SYST BP LT 130 MM HG: CPT | Mod: CPTII,S$GLB,,

## 2022-09-25 PROCEDURE — 1160F PR REVIEW ALL MEDS BY PRESCRIBER/CLIN PHARMACIST DOCUMENTED: ICD-10-PCS | Mod: CPTII,S$GLB,,

## 2022-09-25 PROCEDURE — 3008F PR BODY MASS INDEX (BMI) DOCUMENTED: ICD-10-PCS | Mod: CPTII,S$GLB,,

## 2022-09-25 PROCEDURE — 1159F MED LIST DOCD IN RCRD: CPT | Mod: CPTII,S$GLB,,

## 2022-09-25 RX ORDER — CLONAZEPAM 0.5 MG/1
TABLET ORAL
COMMUNITY
Start: 2022-09-16

## 2022-09-25 RX ORDER — NITROFURANTOIN 25; 75 MG/1; MG/1
100 CAPSULE ORAL 2 TIMES DAILY
Qty: 10 CAPSULE | Refills: 0 | Status: SHIPPED | OUTPATIENT
Start: 2022-09-25 | End: 2022-09-25

## 2022-09-25 RX ORDER — NITROFURANTOIN 25; 75 MG/1; MG/1
100 CAPSULE ORAL 2 TIMES DAILY
Qty: 10 CAPSULE | Refills: 0 | Status: SHIPPED | OUTPATIENT
Start: 2022-09-25 | End: 2022-09-30

## 2022-09-25 NOTE — PATIENT INSTRUCTIONS
UTI   If your condition worsens or fails to improve we recommend that you receive another evaluation at the ER immediately or contact your PCP to discuss your concerns or return here. You must understand that you've received an urgent care treatment only and that you may be released before all your medical problems are known or treated. You the patient will arrange for followup care as instructed.   If you were prescribed antibiotics, please take them to full completion.    If you had cultures done it will take 3-5 days to result. We will call you with the result.   If you are are female and on BCP use additional methods to prevent pregnancy while on the antibiotics and for one cycle after.   Cranberry juice may help. Get the 100% cranberry juice and mix 4 oz of juice with 4 oz of water and drink this 8 oz glass of liquid once a day.      Brea Johnson PA-C sent to Ara Trinidad RN  Caller: Unspecified (Today, 12:24 PM)  Fine to proceed.

## 2022-09-25 NOTE — PROGRESS NOTES
"Subjective:       Patient ID: Sandrine Lr is a 46 y.o. female.    Vitals:  height is 5' 9" (1.753 m) and weight is 68 kg (150 lb). Her tympanic temperature is 98.9 °F (37.2 °C). Her blood pressure is 122/75 and her pulse is 60. Her respiration is 18 and oxygen saturation is 98%.     Chief Complaint: Urinary Tract Infection    Patient is a 46-year-old female who presents with dysuria, frequency, urgency times 2-3 days.  Denies any fever, chills, back or flank pain, vaginal discharge or odor, abdominal pain, nausea, vomiting.    Urinary Tract Infection   This is a new problem. The current episode started acute onset. The problem occurs every urination. The problem has been unchanged. The quality of the pain is described as burning. The pain is at a severity of 2/10. The pain is mild. There has been no fever. Associated symptoms include frequency and urgency. Pertinent negatives include no chills, flank pain, nausea, vomiting or rash. She has tried nothing for the symptoms.     Constitution: Negative for chills.   Gastrointestinal:  Negative for abdominal pain, nausea, vomiting and diarrhea.   Genitourinary:  Positive for dysuria, frequency and urgency. Negative for urine decreased, flank pain, vaginal pain, vaginal discharge, vaginal bleeding, vaginal odor, genital sore and pelvic pain.   Skin:  Negative for rash.   Neurological:  Negative for dizziness and headaches.     Objective:      Physical Exam   Constitutional: She is oriented to person, place, and time. She appears well-developed.   HENT:   Head: Normocephalic and atraumatic.   Ears:   Right Ear: External ear normal.   Left Ear: External ear normal.   Nose: Nose normal. No nasal deformity. No epistaxis.   Mouth/Throat: Oropharynx is clear and moist and mucous membranes are normal.   Eyes: Lids are normal.   Neck: Trachea normal and phonation normal. Neck supple.   Cardiovascular: Normal pulses.   Pulmonary/Chest: Effort normal.   Abdominal: Normal " appearance and bowel sounds are normal. She exhibits no distension. Soft. There is no abdominal tenderness. There is no rebound, no guarding, no left CVA tenderness and no right CVA tenderness.   Neurological: She is alert and oriented to person, place, and time.   Skin: Skin is warm, dry and intact.   Psychiatric: Her speech is normal and behavior is normal.   Nursing note and vitals reviewed.      Results for orders placed or performed in visit on 09/25/22   POCT Urinalysis, Dipstick, Automated, W/O Scope   Result Value Ref Range    POC Blood, Urine Positive (A) Negative    POC Bilirubin, Urine Negative Negative    POC Urobilinogen, Urine abnorm 0.1 - 1.1    POC Ketones, Urine Negative Negative    POC Protein, Urine Positive (A) Negative    POC Nitrates, Urine Negative Negative    POC Glucose, Urine Negative Negative    pH, UA 6.0     POC Specific Gravity, Urine 1.015 1.003 - 1.029    POC Leukocytes, Urine Positive (A) Negative       Assessment:       1. Acute cystitis with hematuria            Plan:         Acute cystitis with hematuria  -     POCT Urinalysis, Dipstick, Automated, W/O Scope  -     Discontinue: nitrofurantoin, macrocrystal-monohydrate, (MACROBID) 100 MG capsule; Take 1 capsule (100 mg total) by mouth 2 (two) times daily. for 5 days  Dispense: 10 capsule; Refill: 0  -     nitrofurantoin, macrocrystal-monohydrate, (MACROBID) 100 MG capsule; Take 1 capsule (100 mg total) by mouth 2 (two) times daily. for 5 days  Dispense: 10 capsule; Refill: 0                 Patient Instructions                                                                       UTI   If your condition worsens or fails to improve we recommend that you receive another evaluation at the ER immediately or contact your PCP to discuss your concerns or return here. You must understand that you've received an urgent care treatment only and that you may be released before all your medical problems are known or treated. You the patient  will arrange for followup care as instructed.   If you were prescribed antibiotics, please take them to full completion.    If you had cultures done it will take 3-5 days to result. We will call you with the result.   If you are are female and on BCP use additional methods to prevent pregnancy while on the antibiotics and for one cycle after.   Cranberry juice may help. Get the 100% cranberry juice and mix 4 oz of juice with 4 oz of water and drink this 8 oz glass of liquid once a day.

## 2024-03-02 ENCOUNTER — OFFICE VISIT (OUTPATIENT)
Dept: URGENT CARE | Facility: CLINIC | Age: 49
End: 2024-03-02
Payer: MEDICAID

## 2024-03-02 VITALS
HEIGHT: 69 IN | RESPIRATION RATE: 20 BRPM | HEART RATE: 70 BPM | SYSTOLIC BLOOD PRESSURE: 107 MMHG | WEIGHT: 150 LBS | DIASTOLIC BLOOD PRESSURE: 72 MMHG | OXYGEN SATURATION: 98 % | BODY MASS INDEX: 22.22 KG/M2 | TEMPERATURE: 98 F

## 2024-03-02 DIAGNOSIS — R10.9 FLANK PAIN: ICD-10-CM

## 2024-03-02 DIAGNOSIS — N39.0 URINARY TRACT INFECTION WITH HEMATURIA, SITE UNSPECIFIED: Primary | ICD-10-CM

## 2024-03-02 DIAGNOSIS — R31.9 URINARY TRACT INFECTION WITH HEMATURIA, SITE UNSPECIFIED: Primary | ICD-10-CM

## 2024-03-02 DIAGNOSIS — R30.0 DYSURIA: ICD-10-CM

## 2024-03-02 PROBLEM — R00.2 PALPITATIONS: Status: ACTIVE | Noted: 2024-03-02

## 2024-03-02 PROBLEM — R94.31 ABNORMAL ECG: Status: ACTIVE | Noted: 2024-03-02

## 2024-03-02 PROBLEM — R00.0 TACHYCARDIA: Status: ACTIVE | Noted: 2021-01-13

## 2024-03-02 LAB
BILIRUB UR QL STRIP: NEGATIVE
GLUCOSE UR QL STRIP: NEGATIVE
KETONES UR QL STRIP: NEGATIVE
LEUKOCYTE ESTERASE UR QL STRIP: NEGATIVE
PH, POC UA: 6.5
POC BLOOD, URINE: POSITIVE
POC NITRATES, URINE: NEGATIVE
PROT UR QL STRIP: NEGATIVE
SP GR UR STRIP: 1 (ref 1–1.03)
UROBILINOGEN UR STRIP-ACNC: NORMAL (ref 0.1–1.1)

## 2024-03-02 PROCEDURE — 87086 URINE CULTURE/COLONY COUNT: CPT

## 2024-03-02 PROCEDURE — 81003 URINALYSIS AUTO W/O SCOPE: CPT | Mod: QW,S$GLB,,

## 2024-03-02 PROCEDURE — 74018 RADEX ABDOMEN 1 VIEW: CPT | Mod: S$GLB,,, | Performed by: RADIOLOGY

## 2024-03-02 PROCEDURE — 99214 OFFICE O/P EST MOD 30 MIN: CPT | Mod: S$GLB,,,

## 2024-03-02 RX ORDER — FLUOXETINE HYDROCHLORIDE 40 MG/1
40 CAPSULE ORAL
COMMUNITY

## 2024-03-02 RX ORDER — CIPROFLOXACIN 250 MG/1
250 TABLET, FILM COATED ORAL EVERY 12 HOURS
COMMUNITY
Start: 2024-01-24

## 2024-03-02 RX ORDER — TRAZODONE HYDROCHLORIDE 100 MG/1
100 TABLET ORAL NIGHTLY
COMMUNITY

## 2024-03-02 RX ORDER — SULFAMETHOXAZOLE AND TRIMETHOPRIM 800; 160 MG/1; MG/1
1 TABLET ORAL 2 TIMES DAILY
Qty: 10 TABLET | Refills: 0 | Status: SHIPPED | OUTPATIENT
Start: 2024-03-02 | End: 2024-03-07

## 2024-03-02 RX ORDER — KETOROLAC TROMETHAMINE 30 MG/ML
30 INJECTION, SOLUTION INTRAMUSCULAR; INTRAVENOUS
Status: COMPLETED | OUTPATIENT
Start: 2024-03-02 | End: 2024-03-02

## 2024-03-02 RX ORDER — FLUTICASONE PROPIONATE 50 MCG
1 SPRAY, SUSPENSION (ML) NASAL
COMMUNITY

## 2024-03-02 RX ADMIN — KETOROLAC TROMETHAMINE 30 MG: 30 INJECTION, SOLUTION INTRAMUSCULAR; INTRAVENOUS at 03:03

## 2024-03-02 NOTE — PATIENT INSTRUCTIONS
Please return here or go to the Emergency Department for any concerns or worsening of condition.    Please take BACTRIM for UTI. Please complete the full course of this antibiotics. Please take this antibiotic with food and a full glass of water.    You were given a TORADOL injection today, please do not take anymore anti-inflammatory medications today. Please start taking anti-inflammatory medications tomorrow.     Please take IBUPROFEN (up to 800 mg) every 8 hours as needed for pain/inflammation, you may decrease to every 12 hour, or once daily, or discontinue as your symptoms improve.     Tylenol:  Regular strength can take 650 mg every 4-6 hours as needed, do not exceed 3,250 mg within a day.  Extra strength can take 1,000 mg every 6 hours as needed, do not exceed 3,000 mg in one day.    Someone will contact you with your results from your urine culture results and any potential changes to your treatment plan.    Please follow up with your primary care doctor or specialist as needed.    If you  smoke, please stop smoking.

## 2024-03-02 NOTE — PROGRESS NOTES
"Subjective:      Patient ID: Sandrine Lr is a 48 y.o. female.    Vitals:  height is 5' 9" (1.753 m) and weight is 68 kg (150 lb). Her oral temperature is 98.3 °F (36.8 °C). Her blood pressure is 107/72 and her pulse is 70. Her respiration is 20 and oxygen saturation is 98%.     Chief Complaint: Back Pain (Pain originate from lower back, spread toward lower abdomen and lower right ribs. She noticed frequent urination when drinking water within 30 mins. )    Patient presents with right-sided flank pain.  Associated symptoms include urinary frequency, lower abdominal cramping.  Onset of symptoms was 2 days ago.  She states that she has not tried anything for symptoms.  She denies trauma to the area.  She denies fever, chills, chest pain, SOB, nausea, vomiting, diarrhea, hematuria.  She rates the pain as 7/10.    Flank Pain  This is a new problem. The current episode started in the past 7 days. The problem is unchanged. Pain location: right flank. The quality of the pain is described as aching. The pain does not radiate. Associated symptoms include abdominal pain (cramping). Pertinent negatives include no bladder incontinence, bowel incontinence, chest pain, dysuria, fever, headaches, leg pain, numbness, paresis, paresthesias, pelvic pain, perianal numbness, tingling, weakness or weight loss. She has tried nothing for the symptoms. The treatment provided no relief.       Constitution: Negative for chills and fever.   Cardiovascular:  Negative for chest pain and sob on exertion.   Respiratory:  Negative for shortness of breath.    Gastrointestinal:  Positive for abdominal pain (cramping). Negative for nausea, vomiting, diarrhea and bowel incontinence.   Genitourinary:  Positive for frequency and flank pain. Negative for dysuria, urgency, bladder incontinence, hematuria, history of kidney stones and pelvic pain.   Skin:  Negative for rash.   Neurological:  Negative for headaches and numbness.      Objective: "     Physical Exam   Constitutional:  Non-toxic appearance. She does not appear ill. No distress.      Comments:Patient is in no acute distress, patient is non-toxic appearing, patient is ox3, patient is answering question appropriately.   normal  Cardiovascular: Normal rate, regular rhythm and normal heart sounds.   No murmur heard.Exam reveals no gallop and no friction rub.   Pulmonary/Chest: Effort normal and breath sounds normal. No stridor. No respiratory distress. She has no wheezes. She has no rhonchi. She has no rales. She exhibits no tenderness.         Comments: Patient is in no respiratory distress. Breath sounds even, unlabored, and clear to auscultation bilaterally. No accessory muscle usage. Patient able to speak in complete sentences with ease.    Abdominal: Normal appearance and bowel sounds are normal. She exhibits no distension and no mass. Soft. There is abdominal tenderness in the suprapubic area. There is no rebound, no guarding, no left CVA tenderness and no right CVA tenderness. No hernia.   Musculoskeletal:      Thoracic back: Normal.      Lumbar back: Normal.   Neurological: She is alert.   Skin: Skin is not diaphoretic.   Nursing note and vitals reviewed.    Results for orders placed or performed in visit on 03/02/24   POCT Urinalysis, Dipstick, Automated, W/O Scope   Result Value Ref Range    POC Blood, Urine Positive (A) Negative    POC Bilirubin, Urine Negative Negative    POC Urobilinogen, Urine normal 0.1 - 1.1    POC Ketones, Urine Negative Negative    POC Protein, Urine Negative Negative    POC Nitrates, Urine Negative Negative    POC Glucose, Urine Negative Negative    pH, UA 6.5     POC Specific Gravity, Urine 1.005 1.003 - 1.029    POC Leukocytes, Urine Negative Negative     XR KUB    Result Date: 3/2/2024  EXAMINATION: XR KUB CLINICAL HISTORY: Unspecified abdominal pain TECHNIQUE: Single AP supine view of the abdomen (KUB) was performed COMPARISON: None FINDINGS: The visualized  lung bases are unremarkable.  No airspace opacity is present. The stomach is nondistended.  The distribution of the bowel gas pattern is nonobstructive.  There is no evidence of pneumatosis.  No portal venous air is identified. There are phleboliths in the pelvis.  There are no calcifications identified overlying the renal shadows. The osseous structures are unremarkable.     1.  Nonobstructive bowel gas pattern. 2.  Phleboliths in the pelvis.  No calcifications overlying the renal shadows. 3.  Additional findings as above. Electronically signed by: Chad Olsen MD Date:    03/02/2024 Time:    15:23     Assessment:     1. Urinary tract infection with hematuria, site unspecified    2. Dysuria    3. Flank pain      Plan:   Previous notes reviewed.  Vital signs reviewed.  Labs ordered. Labs reviewed.  Discussed UTI and flank pain, home care, tx options, and given follow up precautions.  Patient was briefed on my thought process and diagnosis.   Patient involved with the treatment plan and agreed to the plan.    Will treat for UTI and flank pain at this time, patient is stable and nontoxic appearing, physical exam shows suprapubic tenderness to palpation directly on top of the bladder, there is no CVA tenderness, patient is afebrile, UA shows hematuria, KUB is within normal limits, will send urine culture, there is a low suspicion for kidney stone/infected kidney stone/acute abdomen at this time, PCP follow up encouraged, strict ER precautions given.    Patient informed on warning signs, patient understood warning signs and to go to urgent care or ER if warning signs appear.  Please excuse grammatical/spelling errors appreciated throughout this visit encounter for a remote dictation device was used during this encounter.    Patient Instructions   Please return here or go to the Emergency Department for any concerns or worsening of condition.    Please take BACTRIM for UTI. Please complete the full course of this  antibiotics. Please take this antibiotic with food and a full glass of water.    You were given a TORADOL injection today, please do not take anymore anti-inflammatory medications today. Please start taking anti-inflammatory medications tomorrow.     Please take IBUPROFEN (up to 800 mg) every 8 hours as needed for pain/inflammation, you may decrease to every 12 hour, or once daily, or discontinue as your symptoms improve.     Tylenol:  Regular strength can take 650 mg every 4-6 hours as needed, do not exceed 3,250 mg within a day.  Extra strength can take 1,000 mg every 6 hours as needed, do not exceed 3,000 mg in one day.    Someone will contact you with your results from your urine culture results and any potential changes to your treatment plan.    Please follow up with your primary care doctor or specialist as needed.    If you  smoke, please stop smoking.    Urinary tract infection with hematuria, site unspecified  -     sulfamethoxazole-trimethoprim 800-160mg (BACTRIM DS) 800-160 mg Tab; Take 1 tablet by mouth 2 (two) times daily. for 5 days  Dispense: 10 tablet; Refill: 0    Dysuria  -     POCT Urinalysis, Dipstick, Automated, W/O Scope    Flank pain  -     XR KUB; Future; Expected date: 03/02/2024  -     Urine culture  -     ketorolac injection 30 mg      Bob Vazquez PA-C

## 2024-03-05 LAB — BACTERIA UR CULT: NO GROWTH

## 2024-10-03 NOTE — ED TRIAGE NOTES
[place] message sent to pt.     Pt came with daughter Santa unable to sleep despite taking sleeping medication.